# Patient Record
(demographics unavailable — no encounter records)

---

## 2024-11-11 NOTE — HEALTH RISK ASSESSMENT
[Yes] : Yes [Monthly or less (1 pt)] : Monthly or less (1 point) [1 or 2 (0 pts)] : 1 or 2 (0 points) [No] : In the past 12 months have you used drugs other than those required for medical reasons? No [Little interest or pleasure doing things] : 1) Little interest or pleasure doing things [Feeling down, depressed, or hopeless] : 2) Feeling down, depressed, or hopeless [0] : 2) Feeling down, depressed, or hopeless: Not at all (0) [PHQ-2 Negative - No further assessment needed] : PHQ-2 Negative - No further assessment needed [Never] : Never [Patient reported mammogram was normal] : Patient reported mammogram was normal [Patient reported colonoscopy was normal] : Patient reported colonoscopy was normal [Fully functional (bathing, dressing, toileting, transferring, walking, feeding)] : Fully functional (bathing, dressing, toileting, transferring, walking, feeding) [Fully functional (using the telephone, shopping, preparing meals, housekeeping, doing laundry, using] : Fully functional and needs no help or supervision to perform IADLs (using the telephone, shopping, preparing meals, housekeeping, doing laundry, using transportation, managing medications and managing finances) [[id number]] : 0 [MammogramDate] : 02/24 [ColonoscopyDate] : 06/17

## 2025-01-13 NOTE — HISTORY OF PRESENT ILLNESS
[FreeTextEntry8] : Pt is c/o persistent cough for 2-3 weeks. No fever. Reports having negative CXR at Urgent Care

## 2025-03-27 NOTE — HEALTH RISK ASSESSMENT
[No] : In the past 12 months have you used drugs other than those required for medical reasons? No [Little interest or pleasure doing things] : 1) Little interest or pleasure doing things [Feeling down, depressed, or hopeless] : 2) Feeling down, depressed, or hopeless [0] : 2) Feeling down, depressed, or hopeless: Not at all (0) [PHQ-2 Negative - No further assessment needed] : PHQ-2 Negative - No further assessment needed [Never] : Never [[id number]] : 0

## 2025-03-27 NOTE — PHYSICAL EXAM
[No Edema] : there was no peripheral edema [Normal] : affect was normal and insight and judgment were intact [de-identified] : sinus tenderness

## 2025-03-27 NOTE — HISTORY OF PRESENT ILLNESS
[FreeTextEntry8] : c/o cough, sneeze, runny nose for a month. She started to feel better after z-rubens, benzonatate, prednisone and then started again 2 weeks ago. She is congested with yellow mucus.

## 2025-03-27 NOTE — ASSESSMENT
[FreeTextEntry1] : sinusitis rx for cefdinir  cough benzonatate renewed  hypothyroid continue levothyroxine 25 mcg daily

## 2025-06-26 NOTE — HISTORY OF PRESENT ILLNESS
[facility] Office Note  Chief Complaint:   Chief Complaint   Patient presents with    Physical       HPI:   This is a 56 year old male coming in for physical exam.  The patient denies any acute concerns today.  States that he has been taking his medications as prescribed.  He has been on Prozac for anxiety.  Denies any suicidal ideation or homicidal plan.  Denies any chest pain nausea vomiting diarrhea constipation.      Past Medical History:    Anxiety state, unspecified    Obesity, unspecified    Unspecified sleep apnea     Past Surgical History:   Procedure Laterality Date    Appendectomy  1998    Knee scope/surg/incond fx aid+fixat  1985    knee arthroscopy     Social History:  Social History     Socioeconomic History    Marital status:    Tobacco Use    Smoking status: Never    Smokeless tobacco: Never   Substance and Sexual Activity    Alcohol use: Yes     Alcohol/week: 0.0 standard drinks of alcohol     Comment: 6 drinks a week     Drug use: No   Other Topics Concern    Caffeine Concern No    Exercise No     Family History:  Family History   Problem Relation Age of Onset    Other (acute MI) Paternal Grandfather     Cancer Paternal Grandmother     Cancer Father     Hypertension Mother     Thyroid Disorder Sister     Other (stroke syndrome) Maternal Grandfather      Allergies:  Allergies[1]  Current Meds:  Current Outpatient Medications   Medication Sig Dispense Refill    clonazePAM 0.5 MG Oral Tab Take 1 tablet (0.5 mg total) by mouth as needed for Anxiety. 30 tablet 0    FLUoxetine 20 MG Oral Cap Take 1 capsule (20 mg total) by mouth daily. 90 capsule 0    triamcinolone 0.1 % External Cream Apply topically 2 (two) times daily.      acyclovir 200 MG Oral Cap TAKE 1 CAPSULE (200 MG TOTAL) BY MOUTH 5 (FIVE) TIMES DAILY. 25 capsule 0    loratadine 10 MG Oral Tablet Dispersible Take 1 tablet (10 mg total) by mouth daily.        Counseling given: Not Answered       REVIEW OF  SYSTEMS:   Consitutional: No fevers, chills, sweats  Eye: No recent visual problems  ENMT: No ear pain nasal congestion sore throat  Respiratory: No shortness of breath, cough  Cardiovascular: No chest pain palpitations syncope  Gastrointestinal: No nausea vomiting diarrhea  Genitourinary: No hematuria  Hema/Lymph no bruising tendency, swollen lymph glands  Endocrine: Negative for excessive thirst excessive hunger  Musculoskeletal: No back pain neck pain joint pain muscle pain decreased range of motion  Integumentary: No rash, pruritus, abrasions  Neurologic: Alert and oriented x4  Psychiatric: No anxiety, depression    Medical, surgical, family, and social histories were reviewed      EXAM:   VITALS:   Vitals:    03/20/25 0814   BP: 134/82   Pulse: 80   Resp: 18   Temp: 97.5 °F (36.4 °C)      GENERAL: well developed, well nourished, in no apparent distress  SKIN: no rashes, no suspicious lesions: Cool and Dry  HEENT: atraumatic, normocephalic, ears and throat are clear    Ears: TM's clear and visible bilaterally, no excess cerumen or erythema.   EYES: Pupils equal round and reactive.  Extraocular motions intact no scleral icterus no injection or drainage  THROAT without erythema tonsillar hypertrophy or exudate.  Uvula midline airway patent  NECK: Given midline.  No JVD or lymphadenopathy supple nontender no meningeal signs   LUNGS: clear to auscultation sounds equal bilaterally no wheezes rales or rhonchi  CARDIO: Regular rate and rhythm without murmurs gallops or rubs  GI: Soft nontender nondistended no hepatomegaly palpable masses.  No guarding.   without deformity or crepitus no flank tenderness  EXTREMITIES: no cyanosis, clubbing or edema no joint tenderness effusion or edema noted.  No calf tenderness negative Homans' sign bilaterally  NEURO: Awake and alert.  Cranial nerves II through XII intact motor and sensory grossly within normal limits.  5 out of 5 muscle strength in all muscle groups.   [FreeTextEntry1] : blood work Normal speech.  PSYCHIATRIC: Awake, alert and oriented x3, cooperative appropriate mood and affect.  Judgment intact       ASSESSMENT AND PLAN:   1. Healthcare maintenance  I did discuss with the patient at this time that his LDL has been well-controlled at this point.  He was asked to continue to watch his diet all other blood work was within normal limits.  I did discuss with the patient I would suggest for him to update his Shingrix vaccine as well as Tdap.  He was asked to consider this and follow-up in the future.  He was asked to follow-up yearly for regular physical exams.  2. Anxiety  - clonazePAM 0.5 MG Oral Tab; Take 1 tablet (0.5 mg total) by mouth as needed for Anxiety.  Dispense: 30 tablet; Refill: 0  - FLUoxetine 20 MG Oral Cap; Take 1 capsule (20 mg total) by mouth daily.  Dispense: 90 capsule; Refil did discuss with the patient we will go ahead and refill his clonazepam as well as Prozac.  He was asked to continue to take it as prescribed l: 0       Meds & Refills for this Visit:  Requested Prescriptions     Signed Prescriptions Disp Refills    clonazePAM 0.5 MG Oral Tab 30 tablet 0     Sig: Take 1 tablet (0.5 mg total) by mouth as needed for Anxiety.    FLUoxetine 20 MG Oral Cap 90 capsule 0     Sig: Take 1 capsule (20 mg total) by mouth daily.       Health Maintenance:  Health Maintenance Due   Topic Date Due    DTaP,Tdap,and Td Vaccines (1 - Tdap) Never done    Pneumococcal Vaccine: 50+ Years (2 of 2 - PPSV23) 10/05/2018    Zoster Vaccines (1 of 2) Never done    Annual Physical  05/23/2023    PSA  05/23/2024    COVID-19 Vaccine (3 - 2024-25 season) 09/01/2024    Influenza Vaccine (1) 10/01/2024       Patient/Caregiver Education: Patient/Caregiver Education: There are no barriers to learning. Medical education done.   Outcome: Patient verbalizes understanding. Patient is notified to call with any questions, complications, allergies, or worsening or changing symptoms.  Patient is to call with any  [de-identified] : Pt needs updated blood work for her thyroid.  Yesterday went to the dentist and her sinuses were inflammed on the x-ray. She did have an infection a few weeks ago and treated with an antibiotic.  She c/o gerd when she eats something and has to take tums which helps. Gets it after certain  side effects or complications from the treatments as a result of today.     Problem List:  Patient Active Problem List   Diagnosis    Overweight(278.02)    Screening for iron deficiency anemia    Screening for endocrine, metabolic and immunity disorder    Special screening for malignant neoplasm of prostate    Screening for lipoid disorders    Screening for genitourinary condition    Herpes simplex without mention of complication    Annual physical exam    Unspecified internal derangement of knee    Left lower lobe pneumonia    Persistent cough    MIRIAM (obstructive sleep apnea)    Morbid obesity due to excess calories (HCC)    Anxiety with depression    Biliary dyskinesia         No follow-ups on file.     Gopal Telles MD    Please note that portions of this note may have been completed with a voice recognition program. Efforts were made to edit the dictations but occasionally words are mis-transcribed.       [1] No Known Allergies

## 2025-06-26 NOTE — ASSESSMENT
[FreeTextEntry1] : GERD taking tums prn only happens with certain foods will refer to GI she wants H pylori testing again  hypothyroid continue levothyroxine  hyperlipidemia check bw continue atorvastatin

## 2025-07-08 NOTE — PROCEDURE
[FreeTextEntry1] : *no xray tech available today in [facility]**  offered xray order to go to [place] [facility] she would rather wait   PFT: Normal spirometry.  Lung volumes normal. DLCO normal.  Prior exams reviewed:  CXR: clear lungs, no focal consolidation or pleural effusions, cardiac silhouette appears normal.  No bony abnormality.    PFT: Normal spirometry.  Lung volumes normal. DLCO normal.   Procedure was performed at the [facility]  EXAM: CHEST PA LATERAL   PROCEDURE DATE: 05/02/2023   INTERPRETATION: INDICATION: Cough  COMPARISON: Radiograph dated 3/18/2017  FINDINGS: Heart/Vascular: Within normal size limits. Pulmonary: Small linear opacity localizing to lateral basal segment left lower lobe is new from prior examination. Otherwise well aerated. Bones: No acute abnormality identified.  Impression: Subsegmental left basilar atelectasis versus developing lower lobar pneumonia in the proper clinical setting. No parapneumonic effusion.  --- End of Report ---       CORRIE BONILLA MD; Attending Radiologist This document has been electronically signed. May 2 2023 11:37AM

## 2025-07-08 NOTE — ASSESSMENT
[FreeTextEntry1] : will treat with antibiotics for 1 week given duration of cough and exam findings fu 1 week will reassess and obtain cxr.

## 2025-07-08 NOTE — HISTORY OF PRESENT ILLNESS
[Former] : former [TextBox_4] : 59F with HLD  cough x 1 mo impoved with bromphene cough syrup + wheeze + sinus congestion, + pnd no gerd [YearQuit] : 1992

## 2025-07-08 NOTE — PROCEDURE
[FreeTextEntry1] : *no xray tech available today in [facility]**  offered xray order to go to [place] [facility] she would rather wait   PFT: Normal spirometry.  Lung volumes normal. DLCO normal.  Prior exams reviewed:  CXR: clear lungs, no focal consolidation or pleural effusions, cardiac silhouette appears normal.  No bony abnormality.    PFT: Normal spirometry.  Lung volumes normal. DLCO normal.   Procedure was performed at the [facility]  EXAM: CHEST PA LATERAL   PROCEDURE DATE: 05/02/2023   INTERPRETATION: INDICATION: Cough  COMPARISON: Radiograph dated 3/18/2017  FINDINGS: Heart/Vascular: Within normal size limits. Pulmonary: Small linear opacity localizing to lateral basal segment left lower lobe is new from prior examination. Otherwise well aerated. Bones: No acute abnormality identified.  Impression: Subsegmental left basilar atelectasis versus developing lower lobar pneumonia in the proper clinical setting. No parapneumonic effusion.  --- End of Report ---       CORRIE BONILLA MD; Attending Radiologist This document has been electronically signed. May 2 2023 11:37AM

## 2025-07-21 NOTE — PROCEDURE
[FreeTextEntry1] : CXR: clear lungs, no focal consolidation or pleural effusions, cardiac silhouette appears normal.  No bony abnormality.    Prior exams reviewed:  PFT: Normal spirometry.  Lung volumes normal. DLCO normal.  CXR: clear lungs, no focal consolidation or pleural effusions, cardiac silhouette appears normal.  No bony abnormality.    PFT: Normal spirometry.  Lung volumes normal. DLCO normal.   Procedure was performed at the [facility]  EXAM: CHEST PA LATERAL   PROCEDURE DATE: 05/02/2023   INTERPRETATION: INDICATION: Cough  COMPARISON: Radiograph dated 3/18/2017  FINDINGS: Heart/Vascular: Within normal size limits. Pulmonary: Small linear opacity localizing to lateral basal segment left lower lobe is new from prior examination. Otherwise well aerated. Bones: No acute abnormality identified.  Impression: Subsegmental left basilar atelectasis versus developing lower lobar pneumonia in the proper clinical setting. No parapneumonic effusion.  --- End of Report ---       CORRIE BONILLA MD; Attending Radiologist This document has been electronically signed. May 2 2023 11:37AM

## 2025-07-21 NOTE — ASSESSMENT
[FreeTextEntry1] : s/p abx  start flonase for possible pnd start laba/ics now with rinsing for wheeze  reassess in 2 weeks

## 2025-07-21 NOTE — HISTORY OF PRESENT ILLNESS
[Former] : former [TextBox_4] : cough improving after abx still has some wheezing tickle in the throat  Prior hx;  59F with HLD  cough x 1 mo impoved with bromphene cough syrup + wheeze + sinus congestion, + pnd no gerd [YearQuit] : 1992

## 2025-07-23 NOTE — ASSESSMENT
[Levothyroxine] : The patient was instructed to take Levothyroxine on an empty stomach, separate from vitamins, and wait at least 30 minutes before eating [FreeTextEntry1] : 57 year old female with HLD presenting for follow up of Hashimoto's disease.   1. Hashimotos disease  Pt did not tolerate armour thyroid, brand synthroid or levothyroxine.  Will continue LT4 25mcg, increase from QD to 9x/week.  Will repeat TFTs in 6 weeks.   Patient verbalized understanding of the above. All questions were answered to patient's satisfaction. Dispo: Patient will follow up in 6 months.

## 2025-07-23 NOTE — PHYSICAL EXAM
[Alert] : alert [Well Nourished] : well nourished [No Acute Distress] : no acute distress [No Proptosis] : no proptosis [No Neck Mass] : no neck mass was observed [Thyroid Not Enlarged] : the thyroid was not enlarged [No Respiratory Distress] : no respiratory distress [No Accessory Muscle Use] : no accessory muscle use [Normal Rate] : heart rate was normal [Soft] : abdomen soft [No Stigmata of Cushings Syndrome] : no stigmata of Cushings Syndrome [Normal Gait] : normal gait [No Joint Swelling] : no joint swelling seen [No Tremors] : no tremors [Normal Affect] : the affect was normal [Normal Insight/Judgement] : insight and judgment were intact [Normal Mood] : the mood was normal

## 2025-07-23 NOTE — HISTORY OF PRESENT ILLNESS
[FreeTextEntry1] : Ms. Hu is presenting for hypothyroidism.   59 year old female with PMH of prediabetes, hypothyroidism presenting for change in medication for levothyroxine. She was diagnosed with hypothyroidism in 2009, took LT4 for ~1 year but had bilateral breast pain which resolved when she stopped taking the LT4. Then again a few years ago, she restarted LT4 and took it for 1 month but due to breast discomfort she stopped it again. She then restarted it in april for a few weeks but again felt breast discomfort again after a few days of taking it so she has not taken it now for 1 week. No h/o FNA or thyroid usg done. LMP was at age 54. Has some minimal hair loss, gained 20 pounds in 1 year. Started exercising this week by walking. Denies constipation, severe fatigue.   Pertinent labs:  4/2021 TSH 5.4 FT4 0.7  2019: TPO Ab positive 182.0 Oct 2022 TSH 5.31  March 2023 TSH 5.34  Interval hx:  Patient not seen since oct 2023.  June 2023 TSH 5.97 with FT4 0.9 Pt inconsistently taking LT4 25mcg.